# Patient Record
(demographics unavailable — no encounter records)

---

## 2024-11-01 NOTE — PHYSICAL EXAM
[Appropriately responsive] : appropriately responsive [Alert] : alert [No Acute Distress] : no acute distress [Oriented x3] : oriented x3 [FreeTextEntry7] : incision healed well

## 2024-11-01 NOTE — DISCUSSION/SUMMARY
[FreeTextEntry1] : 29 y/o  s/p rCS on 24. - [place] screen negative, mood appropriate, feels well taking care of baby - discussed continuing prenatal vitamins - discussed continued BF for 6 months, exclusively if possible (benefits explained) - discussed adequate diet and physical activity - Contraception: desires DMPA, administered today (LEFT DELTOID, LOT [id number], EXP 2027, NDC [id number]), tolerated well  - Last PAP: none on record  She verbalized understanding and agreement with above counseling regarding differential diagnosis, evaluation, and plan. She was given time for questions/concerns which were all answered to her apparent satisfaction. All designated lab work for today drawn in office.  RTO 3m for ANNUAL + next DMPA administration visit

## 2024-11-01 NOTE — HISTORY OF PRESENT ILLNESS
[N] : Patient does not use contraception [Y] : Positive pregnancy history [No] : Patient does not have concerns regarding sex [Currently Active] : currently active [Men] : men [LMPDate] : 12/20/23 [PGHxTotal] : 4 [[facility] : 2 [[facility]Full[facility]] : 2 [PGHxABSpont] : 2 [FreeTextEntry1] : 12/20/23

## 2025-03-05 NOTE — END OF VISIT
[FreeTextEntry3] : I, Bianka Chanel solely acted as scribe for Dr. Jet Cote on 03/05/2025 All medical entries made by the Scribe were at my, Dr. Cote, direction and personally dictated by me on 03/05/2025 . I have reviewed the chart and agree that the record accurately reflects my personal performance of the history, physical exam, assessment and plan. I have also personally directed, reviewed, and agreed with the chart.

## 2025-03-05 NOTE — HISTORY OF PRESENT ILLNESS
[N] : Patient reports normal menses [DepoProvera] : uses depo-medroxyprogesterone [Y] : Positive pregnancy history [Menarche Age: ____] : age at menarche was [unfilled] [LMP unknown] : LMP unknown [unknown] : Patient is unsure of the date of her LMP [Currently Active] : currently active [Men] : men [GonorrheaDate] : 04/10/24 [ChlamydiaDate] : 04/10/24 [TextBox_63] : NEG [TextBox_68] : NEG [PGHxTotal] : 4 [[facility]Term] : 2 [[facility]] : 2 [PGHxABSpont] : 2 [FreeTextEntry1] :  x2

## 2025-03-05 NOTE — DISCUSSION/SUMMARY
[FreeTextEntry1] : 30 year old   (LMP unknown) presents for annual exam.   #Well Woman Visit 1. Nutrition/Activity: The benefits of balanced diet and physical activity discussed with patient. 2. Health Screening: She was informed of the benefits of a screening colonoscopy/DEXA/Mammo/Pap. - Cervix: We reviewed ASCCP/ACOG guidelines for pap smear screening. Last Pap 2024, NILM; HPV: NEG . Pt desires and collected today.  3. Sex Health:  The importance of safe-sex practices was discussed with the patient. STD screening was offered to patient, she desires full STD panel, collected today. 4. Contraception: she uses depo-medroxyprogesterone. Pt is feeling well and wants to continue using depo. Pt will return in 3 months for next depo shot to be administered in this office.   She verbalized understanding and agreement with above counseling regarding differential diagnosis, evaluation, and plan. She was given time for questions/concerns which were all answered to her apparent satisfaction. All designated lab work for today drawn in office.   RTO 3 months for next depo shot. RTO 1y for next ANNUAL  -

## 2025-03-05 NOTE — PHYSICAL EXAM
[Appropriately responsive] : appropriately responsive [Alert] : alert [No Acute Distress] : no acute distress [Oriented x3] : oriented x3 [Labia Majora] : normal [Labia Minora] : normal [Normal] : normal [Uterine Adnexae] : normal [Chaperone Present] : A chaperone was present in the examining room during all aspects of the physical examination [FreeTextEntry2] : Shayna FUENTES MA